# Patient Record
Sex: MALE | Race: BLACK OR AFRICAN AMERICAN
[De-identification: names, ages, dates, MRNs, and addresses within clinical notes are randomized per-mention and may not be internally consistent; named-entity substitution may affect disease eponyms.]

---

## 2017-09-17 ENCOUNTER — HOSPITAL ENCOUNTER (EMERGENCY)
Dept: HOSPITAL 61 - ER | Age: 32
Discharge: HOME | End: 2017-09-17
Payer: SELF-PAY

## 2017-09-17 VITALS
DIASTOLIC BLOOD PRESSURE: 61 MMHG | SYSTOLIC BLOOD PRESSURE: 110 MMHG | DIASTOLIC BLOOD PRESSURE: 61 MMHG | SYSTOLIC BLOOD PRESSURE: 110 MMHG

## 2017-09-17 VITALS — WEIGHT: 181 LBS | HEIGHT: 75 IN | BODY MASS INDEX: 22.5 KG/M2
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
## 2017-09-17 DIAGNOSIS — Z79.899: ICD-10-CM

## 2017-09-17 DIAGNOSIS — S01.81XA: ICD-10-CM

## 2017-09-17 DIAGNOSIS — S60.211A: ICD-10-CM

## 2017-09-17 DIAGNOSIS — Z91.19: ICD-10-CM
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
## 2017-09-17 DIAGNOSIS — G40.909: Primary | ICD-10-CM

## 2017-09-17 LAB
ALBUMIN SERPL-MCNC: 4.4 G/DL (ref 3.4–5)
ALBUMIN/GLOB SERPL: 1.5 {RATIO} (ref 1–1.7)
ALP SERPL-CCNC: 76 U/L (ref 46–116)
ALT SERPL-CCNC: 53 U/L (ref 16–63)
ANION GAP SERPL CALC-SCNC: 19 MMOL/L (ref 6–14)
AST SERPL-CCNC: 30 U/L (ref 15–37)
BILIRUB SERPL-MCNC: 0.2 MG/DL (ref 0.2–1)
BUN SERPL-MCNC: 15 MG/DL (ref 8–26)
BUN/CREAT SERPL: 11 (ref 6–20)
CALCIUM SERPL-MCNC: 10 MG/DL (ref 8.5–10.1)
CHLORIDE SERPL-SCNC: 103 MMOL/L (ref 98–107)
CO2 SERPL-SCNC: 20 MMOL/L (ref 21–32)
CREAT SERPL-MCNC: 1.4 MG/DL (ref 0.7–1.3)
GFR SERPLBLD BASED ON 1.73 SQ M-ARVRAT: 58.7 ML/MIN
GLOBULIN SER-MCNC: 2.9 G/DL (ref 2.2–3.8)
GLUCOSE SERPL-MCNC: 83 MG/DL (ref 70–99)
POTASSIUM SERPL-SCNC: 4.1 MMOL/L (ref 3.5–5.1)
PROT SERPL-MCNC: 7.3 G/DL (ref 6.4–8.2)
SODIUM SERPL-SCNC: 142 MMOL/L (ref 136–145)

## 2017-09-17 PROCEDURE — 73110 X-RAY EXAM OF WRIST: CPT

## 2017-09-17 PROCEDURE — 80053 COMPREHEN METABOLIC PANEL: CPT

## 2017-09-17 PROCEDURE — 96365 THER/PROPH/DIAG IV INF INIT: CPT

## 2017-09-17 PROCEDURE — 99285 EMERGENCY DEPT VISIT HI MDM: CPT

## 2017-09-17 PROCEDURE — 80185 ASSAY OF PHENYTOIN TOTAL: CPT

## 2017-09-17 PROCEDURE — 36415 COLL VENOUS BLD VENIPUNCTURE: CPT

## 2017-09-17 NOTE — PHYS DOC
Past Medical History


Past Medical History:  Seizure


Alcohol Use:  None


Drug Use:  None





Adult General


Chief Complaint


Chief Complaint:  seizure





HPI


HPI





Patient is a 32  year old male who presents with noncompliance with his 

Dilantin for one week with a history of seizure disorder had a 30 seconds 

generalized tonic-clonic seizure while at work witnessed just prior to arrival. 

Complains of soreness to his tongue chin and right wrist. Denies any head 

trauma headache blurry vision or neck pain. Denies Pain to shoulders. Has 

seizures once a year approximately.





Review of Systems


Review of Systems





Constitutional: Denies fever or chills []


Eyes: Denies change in visual acuity, redness, or eye pain []


HENT: Denies nasal congestion or sore throat []


Respiratory: Denies cough or shortness of breath []


Cardiovascular: No additional information not addressed in HPI []


GI: Denies abdominal pain, nausea, vomiting, bloody stools or diarrhea []


: Denies dysuria or hematuria []


Musculoskeletal: Denies back pain or joint pain []


Integument: Denies rash or skin lesions []


Neurologic: Denies headache, focal weakness or sensory changes []


Endocrine: Denies polyuria or polydipsia []





Current Medications


Current Medications





Current Medications








 Medications


  (Trade)  Dose


 Ordered  Sig/Chay  Start Time


 Stop Time Status Last Admin


Dose Admin


 


 Fosphenytoin


 Sodium 1000 mg/


 Sodium Chloride  70 ml @ 


 280 mls/hr  1X  ONCE  9/17/17 19:15


 9/17/17 19:29 DC 9/17/17 19:30


280 MLS/HR


 


 Ibuprofen


  (Motrin)  600 mg  1X  ONCE  9/17/17 20:30


 9/17/17 20:31   


 


 


 Sodium Chloride  1,000 ml @ 


 1,000 mls/hr  1X  ONCE  9/17/17 19:15


 9/17/17 20:14 DC 9/17/17 19:29


1,000 MLS/HR











Allergies


Allergies





Allergies








Coded Allergies Type Severity Reaction Last Updated Verified


 


  No Known Drug Allergies    9/17/17 No











Physical Exam


Physical Exam





Constitutional: Well developed, well nourished, no acute distress, non-toxic 

appearance. []


HENT: Normocephalic, atraumatic, bilateral external ears normal, oropharynx 

moist, no oral exudates, nose normal. Abrasion to chin but has a full beard 

makes it difficult to assess. Superficial abrasion the right side of tongue. []


Eyes: PERRLA, EOMI, conjunctiva normal, no discharge. [] 


Neck: Normal range of motion, no tenderness, supple, no stridor. [] 


Cardiovascular:Heart rate regular rhythm, no murmur []


Lungs & Thorax:  Bilateral breath sounds clear to auscultation []


Abdomen: Bowel sounds normal, soft, no tenderness, no masses, no pulsatile 

masses. [] 


Skin: Warm, dry, no erythema, no rash. [] 


Back: No tenderness, no CVA tenderness. [] 


Extremities: No tenderness, no cyanosis, no clubbing, ROM intact, no edema. 

Tenderness to the ulnar aspect of the right wrist no snuffbox tenderness no 

swelling or deformity seen. Neurovascularly intact in that right wrist with 

tendon function intact.[] 


Neurologic: Alert and oriented X 3, normal motor function, normal sensory 

function, no focal deficits noted. []


Psychologic: Affect normal, judgement normal, mood normal. []





Current Patient Data


Vital Signs





 Vital Signs








  Date Time  Temp Pulse Resp B/P (MAP) Pulse Ox O2 Delivery O2 Flow Rate FiO2


 


9/17/17 19:00 97.5 85 21 100/64 (76) 99 Room Air  





 97.5       








Lab Values





 Laboratory Tests








Test


  9/17/17


19:05


 


Sodium Level


  142 mmol/L


(136-145)


 


Potassium Level


  4.1 mmol/L


(3.5-5.1)


 


Chloride Level


  103 mmol/L


()


 


Carbon Dioxide Level


  20 mmol/L


(21-32)  L


 


Anion Gap 19 (6-14)  H


 


Blood Urea Nitrogen


  15 mg/dL


(8-26)


 


Creatinine


  1.4 mg/dL


(0.7-1.3)  H


 


Estimated GFR


(Cockcroft-Gault) 58.7  


 


 


BUN/Creatinine Ratio 11 (6-20)  


 


Glucose Level


  83 mg/dL


(70-99)


 


Calcium Level


  10.0 mg/dL


(8.5-10.1)


 


Total Bilirubin


  0.2 mg/dL


(0.2-1.0)


 


Aspartate Amino Transferase


(AST) 30 U/L (15-37)


 


 


Alanine Aminotransferase (ALT)


  53 U/L (16-63)


 


 


Alkaline Phosphatase


  76 U/L


()


 


Total Protein


  7.3 g/dL


(6.4-8.2)


 


Albumin


  4.4 g/dL


(3.4-5.0)


 


Albumin/Globulin Ratio 1.5 (1.0-1.7)  


 


Phenytoin (Dilantin) Level


  < 0.5 mcg/mL


(10.0-20.0)  L


 


Phenytoin Last Dose Date 9/10/17  


 


Phenytoin Last Dose Time 0800  





 Laboratory Tests


9/17/17 19:05











EKG


EKG


[]





Radiology/Procedures


Radiology/Procedures


Right wrist x-ray was negative for fracture dislocation my interpretation my 

review the images[]





Course & Med Decision Making


Course & Med Decision Making


Pertinent Labs and Imaging studies reviewed. (See chart for details)





he reports medical noncompliance with his Dilantin and he is out of his 

medications and has not taken it one week. Counseled the patient regarding not 

being able to drive for at least 6 months while seizure-free and needs a doctor'

s note for clearance to return to driving.





[Patient was stable during his stay he was loaded with Dilantin x-ray of his 

right wrist was negative electrolytes were normal. Patient stable for dismissal 

and I have given him a prescription for Dilantin and advised him to actually 

take it.]





Dragon Disclaimer


Dragon Disclaimer


This electronic medical record was generated, in whole or in part, using a 

voice recognition dictation system.





Departure


Departure


Impression:  


 Primary Impression:  


 Seizure


 Additional Impressions:  


 Medical non-compliance


 Laceration of chin


 Contusion of right wrist


Disposition:  01 HOME, SELF-CARE


Condition:  STABLE


Patient Instructions:  Seizure, Adult, Easy-to-Read


Scripts


Phenytoin Sodium Extended (DILANTIN) 100 Mg Capsule


3 CAP PO HS, #90 CAP 3 Refills


   Prov: MARY ONTIVEROS MD         9/17/17





Problem Qualifiers











MARY ONTIVEROS MD Sep 17, 2017 19:15

## 2017-09-18 NOTE — RAD
Right wrist, 3 views, 9/17/2017:



History: Wrist pain after a seizure.



No fracture or dislocation is identified. The soft tissues are unremarkable.



IMPRESSION: No acute right wrist abnormality is detected.

## 2019-09-24 ENCOUNTER — HOSPITAL ENCOUNTER (OUTPATIENT)
Dept: HOSPITAL 61 - KCIC MRI | Age: 34
Discharge: HOME | End: 2019-09-24
Attending: PHYSICIAN ASSISTANT
Payer: COMMERCIAL

## 2019-09-24 DIAGNOSIS — M50.122: ICD-10-CM

## 2019-09-24 DIAGNOSIS — M48.02: Primary | ICD-10-CM

## 2019-09-24 DIAGNOSIS — M47.22: ICD-10-CM

## 2019-09-24 PROCEDURE — 72141 MRI NECK SPINE W/O DYE: CPT
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
